# Patient Record
Sex: MALE | Race: BLACK OR AFRICAN AMERICAN | Employment: FULL TIME | URBAN - METROPOLITAN AREA
[De-identification: names, ages, dates, MRNs, and addresses within clinical notes are randomized per-mention and may not be internally consistent; named-entity substitution may affect disease eponyms.]

---

## 2020-11-22 ENCOUNTER — HOSPITAL ENCOUNTER (EMERGENCY)
Age: 30
Discharge: HOME | End: 2020-11-22
Attending: EMERGENCY MEDICINE
Payer: COMMERCIAL

## 2020-11-22 VITALS
OXYGEN SATURATION: 100 % | DIASTOLIC BLOOD PRESSURE: 77 MMHG | HEIGHT: 73 IN | HEART RATE: 84 BPM | SYSTOLIC BLOOD PRESSURE: 148 MMHG | BODY MASS INDEX: 25.18 KG/M2 | WEIGHT: 190 LBS | RESPIRATION RATE: 20 BRPM

## 2020-11-22 DIAGNOSIS — Z20.822 ENCOUNTER FOR LABORATORY TESTING FOR COVID-19 VIRUS: Primary | ICD-10-CM

## 2020-11-22 PROCEDURE — 99283 EMERGENCY DEPT VISIT LOW MDM: CPT

## 2020-11-22 NOTE — ED INITIAL ASSESSMENT (HPI)
Patient states he wants a  COVID test due to off and on sore throat and headaches for two days. Denies trouble breathing.

## 2020-11-22 NOTE — ED PROVIDER NOTES
Patient Seen in: THE [facility] Emergency Department In [place]      History   Patient presents with:  Testing    Stated Complaint: Pt \"here for COVID test\"    HPI    51-year-old male presents requesting a Covid test.  He reports no known exposures.   He rep Reviewed   SARS-COV-2 BY PCR ()                  MDM      Well-appearing 80-year-old male with intermittent headache and mild sore throat presents requesting Covid test.  Covid swab taken. Well-appearing patient.   Discharged to quarantine until resul

## 2024-07-10 ENCOUNTER — HOSPITAL ENCOUNTER (EMERGENCY)
Age: 34
Discharge: HOME | End: 2024-07-10
Attending: STUDENT IN AN ORGANIZED HEALTH CARE EDUCATION/TRAINING PROGRAM

## 2024-07-10 VITALS
TEMPERATURE: 98 F | WEIGHT: 190 LBS | DIASTOLIC BLOOD PRESSURE: 80 MMHG | OXYGEN SATURATION: 100 % | HEIGHT: 73 IN | RESPIRATION RATE: 18 BRPM | SYSTOLIC BLOOD PRESSURE: 131 MMHG | BODY MASS INDEX: 25.18 KG/M2 | HEART RATE: 67 BPM

## 2024-07-10 DIAGNOSIS — R59.9 ENLARGED LYMPH NODES: Primary | ICD-10-CM

## 2024-07-10 PROCEDURE — 99283 EMERGENCY DEPT VISIT LOW MDM: CPT

## 2024-07-10 PROCEDURE — 99282 EMERGENCY DEPT VISIT SF MDM: CPT

## 2024-07-10 NOTE — ED PROVIDER NOTES
History     Chief Complaint   Patient presents with    Other       HPI    33 year old male presents for evaluation of a lump in his right neckline that he noticed today.  It is a bit tender to touch.  He denies any trauma, fevers, cough, congestion.  He does occasionally smoke          History reviewed. No pertinent past medical history.    History reviewed. No pertinent surgical history.    Social History     Socioeconomic History    Marital status: Single   Tobacco Use    Smoking status: Former     Types: Cigarettes    Smokeless tobacco: Never   Vaping Use    Vaping status: Some Days   Substance and Sexual Activity    Alcohol use: Yes    Drug use: Not Currently     Types: Cannabis     Social Determinants of Health      Received from [facility]    Housing Stability                   Physical Exam     ED Triage Vitals [07/10/24 0145]   /80   Pulse 67   Resp 18   Temp 97.8 °F (36.6 °C)   Temp src Oral   SpO2 100 %   O2 Device None (Room air)       Physical Exam  Constitutional:       General: He is not in acute distress.  HENT:      Head: Normocephalic and atraumatic.      Right Ear: Tympanic membrane normal.      Left Ear: Tympanic membrane normal.      Nose: Nose normal. No congestion.      Mouth/Throat:      Mouth: Mucous membranes are moist.      Pharynx: No oropharyngeal exudate.   Eyes:      Extraocular Movements: Extraocular movements intact.      Conjunctiva/sclera: Conjunctivae normal.   Neck:      Comments: There is mild right anterior cervical lymphadenopathy with a more single prominent lymph node along the lower cervical chain.  No induration or drainage  Cardiovascular:      Rate and Rhythm: Normal rate.   Musculoskeletal:      Cervical back: Normal range of motion and neck supple. No rigidity.   Neurological:      Mental Status: He is alert.              ED Course     Labs Reviewed - No data to display  No results found.        [facility]     Vitals:    07/10/24 0145   BP: 131/80    Pulse: 67   Resp: 18   Temp: 97.8 °F (36.6 °C)   TempSrc: Oral   SpO2: 100%   Weight: 86.2 kg   Height: 185.4 cm (6' 1\")       Tender lymphadenopathy.  No other sick symptoms.  Patient is nontoxic-appearing otherwise.  Discussed abortive measures with NSAIDs and close observation, PCP follow-up if symptoms persist           Disposition and Plan     Clinical Impression:  1. Enlarged lymph nodes        Disposition:  Discharge    Follow-up:  Taylor Boswell,   [address] [facility] [address]  [facility] [id number] [phone]    Follow up  As needed, If symptoms worsen      Medications Prescribed:  There are no discharge medications for this patient.

## 2024-07-10 NOTE — ED INITIAL ASSESSMENT (HPI)
Patient to ER with c/o enlarged lymph node to right neck. Patient denies any recent illness. No pain medications taken PTA.

## 2024-11-02 ENCOUNTER — HOSPITAL ENCOUNTER (EMERGENCY)
Age: 34
Discharge: HOME | End: 2024-11-02
Attending: EMERGENCY MEDICINE
Payer: COMMERCIAL

## 2024-11-02 VITALS
RESPIRATION RATE: 18 BRPM | DIASTOLIC BLOOD PRESSURE: 73 MMHG | TEMPERATURE: 98 F | BODY MASS INDEX: 28.23 KG/M2 | OXYGEN SATURATION: 98 % | HEIGHT: 74 IN | SYSTOLIC BLOOD PRESSURE: 124 MMHG | WEIGHT: 220 LBS | HEART RATE: 78 BPM
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
## 2024-11-02 DIAGNOSIS — S70.01XA CONTUSION OF RIGHT HIP, INITIAL ENCOUNTER: ICD-10-CM
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
## 2024-11-02 DIAGNOSIS — M54.9 UPPER BACK PAIN: ICD-10-CM

## 2024-11-02 PROCEDURE — 99284 EMERGENCY DEPT VISIT MOD MDM: CPT

## 2024-11-02 PROCEDURE — 96374 THER/PROPH/DIAG INJ IV PUSH: CPT

## 2024-11-02 RX ORDER — KETOROLAC TROMETHAMINE 30 MG/ML
30 INJECTION, SOLUTION INTRAMUSCULAR; INTRAVENOUS ONCE
Status: COMPLETED | OUTPATIENT
Start: 2024-11-02 | End: 2024-11-02

## 2024-11-02 RX ORDER — CYCLOBENZAPRINE HCL 10 MG
10 TABLET ORAL 3 TIMES DAILY PRN
Qty: 20 TABLET | Refills: 0 | Status: SHIPPED | OUTPATIENT
Start: 2024-11-02 | End: 2024-11-09

## 2024-11-02 RX ORDER — LIDOCAINE 50 MG/G
1 PATCH TOPICAL EVERY 24 HOURS
Qty: 30 EACH | Refills: 0 | Status: SHIPPED | OUTPATIENT
Start: 2024-11-02
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
## 2024-11-02 NOTE — DISCHARGE INSTRUCTIONS
Please take the cyclobenzaprine as prescribed as needed for muscle pain. Take motrin 600mg every 6 hours as needed for pain. Return to the ER if symptoms worsen or progress.